# Patient Record
Sex: FEMALE | Race: BLACK OR AFRICAN AMERICAN | NOT HISPANIC OR LATINO | URBAN - METROPOLITAN AREA
[De-identification: names, ages, dates, MRNs, and addresses within clinical notes are randomized per-mention and may not be internally consistent; named-entity substitution may affect disease eponyms.]

---

## 2020-07-25 ENCOUNTER — TELEPHONE ENCOUNTER (OUTPATIENT)
Dept: URBAN - METROPOLITAN AREA CLINIC 13 | Facility: CLINIC | Age: 75
End: 2020-07-25

## 2020-07-25 RX ORDER — POLYETHYLENE GLYCOL 3350, SODIUM CHLORIDE, SODIUM BICARBONATE AND POTASSIUM CHLORIDE WITH LEMON FLAVOR 420; 11.2; 5.72; 1.48 G/4L; G/4L; G/4L; G/4L
TAKE 1/2 GALLON AT 5:00 PM DAY BEFORE PROCEDURE, TAKE SECOND 1/2 OF GALLON 6 HRS PRIOR TO PROCEDURE POWDER, FOR SOLUTION ORAL
Qty: 1 | Refills: 0 | OUTPATIENT
Start: 2018-10-15 | End: 2018-11-26

## 2020-07-26 ENCOUNTER — TELEPHONE ENCOUNTER (OUTPATIENT)
Dept: URBAN - METROPOLITAN AREA CLINIC 13 | Facility: CLINIC | Age: 75
End: 2020-07-26

## 2020-07-26 RX ORDER — METOPROLOL TARTRATE 50 MG/1
TAKE 1 TABLET DAILY TABLET, FILM COATED ORAL
Refills: 0 | Status: ACTIVE | COMMUNITY
Start: 2018-10-15

## 2020-07-26 RX ORDER — LOSARTAN POTASSIUM AND HYDROCHLOROTHIAZIDE 100; 25 MG/1; MG/1
TAKE 1 TABLET ONCE DAILY TABLET, FILM COATED ORAL
Refills: 0 | Status: ACTIVE | COMMUNITY
Start: 2018-10-15

## 2020-07-26 RX ORDER — AMLODIPINE BESYLATE 5 MG/1
TAKE 1 TABLET DAILY FOR BLOOD PRESSURE TABLET ORAL
Refills: 0 | Status: ACTIVE | COMMUNITY
Start: 2018-10-15

## 2020-07-26 RX ORDER — NAPROXEN SODIUM 220 MG
TAKE 1 TABLET DAILY PRN TABLET ORAL
Refills: 0 | Status: ACTIVE | COMMUNITY
Start: 2018-10-15

## 2023-01-01 ENCOUNTER — NON-APPOINTMENT (OUTPATIENT)
Age: 78
End: 2023-01-01

## 2023-01-01 ENCOUNTER — OUTPATIENT (OUTPATIENT)
Dept: OUTPATIENT SERVICES | Facility: HOSPITAL | Age: 78
LOS: 1 days | Discharge: HOME | End: 2023-01-01

## 2023-01-01 ENCOUNTER — APPOINTMENT (OUTPATIENT)
Dept: HEMATOLOGY ONCOLOGY | Facility: CLINIC | Age: 78
End: 2023-01-01

## 2023-01-01 ENCOUNTER — INPATIENT (INPATIENT)
Facility: HOSPITAL | Age: 78
LOS: 0 days | End: 2023-10-03
Attending: STUDENT IN AN ORGANIZED HEALTH CARE EDUCATION/TRAINING PROGRAM | Admitting: STUDENT IN AN ORGANIZED HEALTH CARE EDUCATION/TRAINING PROGRAM
Payer: MEDICARE

## 2023-01-01 ENCOUNTER — TRANSCRIPTION ENCOUNTER (OUTPATIENT)
Age: 78
End: 2023-01-01

## 2023-01-01 VITALS
HEIGHT: 70 IN | DIASTOLIC BLOOD PRESSURE: 57 MMHG | WEIGHT: 264.55 LBS | OXYGEN SATURATION: 98 % | SYSTOLIC BLOOD PRESSURE: 126 MMHG | RESPIRATION RATE: 18 BRPM | HEART RATE: 68 BPM | TEMPERATURE: 98 F

## 2023-01-01 VITALS
SYSTOLIC BLOOD PRESSURE: 111 MMHG | RESPIRATION RATE: 25 BRPM | HEART RATE: 108 BPM | OXYGEN SATURATION: 95 % | DIASTOLIC BLOOD PRESSURE: 78 MMHG

## 2023-01-01 DIAGNOSIS — Z29.9 ENCOUNTER FOR PROPHYLACTIC MEASURES, UNSPECIFIED: ICD-10-CM

## 2023-01-01 DIAGNOSIS — I10 ESSENTIAL (PRIMARY) HYPERTENSION: ICD-10-CM

## 2023-01-01 DIAGNOSIS — Z98.49 CATARACT EXTRACTION STATUS, UNSPECIFIED EYE: Chronic | ICD-10-CM

## 2023-01-01 DIAGNOSIS — A41.9 SEPSIS, UNSPECIFIED ORGANISM: ICD-10-CM

## 2023-01-01 DIAGNOSIS — I26.99 OTHER PULMONARY EMBOLISM WITHOUT ACUTE COR PULMONALE: ICD-10-CM

## 2023-01-01 DIAGNOSIS — N17.9 ACUTE KIDNEY FAILURE, UNSPECIFIED: ICD-10-CM

## 2023-01-01 DIAGNOSIS — C53.0 MALIGNANT NEOPLASM OF ENDOCERVIX: ICD-10-CM

## 2023-01-01 DIAGNOSIS — R09.89 OTHER SPECIFIED SYMPTOMS AND SIGNS INVOLVING THE CIRCULATORY AND RESPIRATORY SYSTEMS: ICD-10-CM

## 2023-01-01 DIAGNOSIS — D50.9 IRON DEFICIENCY ANEMIA, UNSPECIFIED: ICD-10-CM

## 2023-01-01 DIAGNOSIS — R65.10 SYSTEMIC INFLAMMATORY RESPONSE SYNDROME (SIRS) OF NON-INFECTIOUS ORIGIN WITHOUT ACUTE ORGAN DYSFUNCTION: ICD-10-CM

## 2023-01-01 DIAGNOSIS — E78.5 HYPERLIPIDEMIA, UNSPECIFIED: ICD-10-CM

## 2023-01-01 DIAGNOSIS — C55 MALIGNANT NEOPLASM OF UTERUS, PART UNSPECIFIED: ICD-10-CM

## 2023-01-01 DIAGNOSIS — E04.1 NONTOXIC SINGLE THYROID NODULE: ICD-10-CM

## 2023-01-01 LAB
ALBUMIN SERPL ELPH-MCNC: 2.4 G/DL — LOW (ref 3.3–5)
ALBUMIN SERPL ELPH-MCNC: 2.4 G/DL — LOW (ref 3.3–5)
ALBUMIN SERPL ELPH-MCNC: 2.8 G/DL — LOW (ref 3.3–5)
ALP SERPL-CCNC: 78 U/L — SIGNIFICANT CHANGE UP (ref 40–120)
ALP SERPL-CCNC: 81 U/L — SIGNIFICANT CHANGE UP (ref 40–120)
ALP SERPL-CCNC: 92 U/L — SIGNIFICANT CHANGE UP (ref 40–120)
ALT FLD-CCNC: 10 U/L — SIGNIFICANT CHANGE UP (ref 4–33)
ALT FLD-CCNC: 11 U/L — SIGNIFICANT CHANGE UP (ref 4–33)
ALT FLD-CCNC: 12 U/L — SIGNIFICANT CHANGE UP (ref 4–33)
ANION GAP SERPL CALC-SCNC: 14 MMOL/L — SIGNIFICANT CHANGE UP (ref 7–14)
ANION GAP SERPL CALC-SCNC: 17 MMOL/L — HIGH (ref 7–14)
ANION GAP SERPL CALC-SCNC: 19 MMOL/L — HIGH (ref 7–14)
APPEARANCE UR: ABNORMAL
APTT BLD: 27.6 SEC — SIGNIFICANT CHANGE UP (ref 24.5–35.6)
APTT BLD: 79.5 SEC — HIGH (ref 24.5–35.6)
APTT BLD: 94 SEC — HIGH (ref 24.5–35.6)
AST SERPL-CCNC: 32 U/L — SIGNIFICANT CHANGE UP (ref 4–32)
AST SERPL-CCNC: 33 U/L — HIGH (ref 4–32)
AST SERPL-CCNC: 40 U/L — HIGH (ref 4–32)
BACTERIA # UR AUTO: NEGATIVE /HPF — SIGNIFICANT CHANGE UP
BASE EXCESS BLDV CALC-SCNC: -2.1 MMOL/L — LOW (ref -2–3)
BASE EXCESS BLDV CALC-SCNC: -5.3 MMOL/L — LOW (ref -2–3)
BASE EXCESS BLDV CALC-SCNC: -5.8 MMOL/L — LOW (ref -2–3)
BASOPHILS # BLD AUTO: 0.02 K/UL — SIGNIFICANT CHANGE UP (ref 0–0.2)
BASOPHILS NFR BLD AUTO: 0.1 % — SIGNIFICANT CHANGE UP (ref 0–2)
BILIRUB SERPL-MCNC: 0.6 MG/DL — SIGNIFICANT CHANGE UP (ref 0.2–1.2)
BILIRUB SERPL-MCNC: 0.6 MG/DL — SIGNIFICANT CHANGE UP (ref 0.2–1.2)
BILIRUB SERPL-MCNC: 0.8 MG/DL — SIGNIFICANT CHANGE UP (ref 0.2–1.2)
BILIRUB UR-MCNC: NEGATIVE — SIGNIFICANT CHANGE UP
BLD GP AB SCN SERPL QL: NEGATIVE — SIGNIFICANT CHANGE UP
BLOOD GAS VENOUS COMPREHENSIVE RESULT: SIGNIFICANT CHANGE UP
BUN SERPL-MCNC: 24 MG/DL — HIGH (ref 7–23)
BUN SERPL-MCNC: 28 MG/DL — HIGH (ref 7–23)
BUN SERPL-MCNC: 29 MG/DL — HIGH (ref 7–23)
CA-I SERPL-SCNC: 1.31 MMOL/L — SIGNIFICANT CHANGE UP (ref 1.15–1.33)
CALCIUM SERPL-MCNC: 8.9 MG/DL — SIGNIFICANT CHANGE UP (ref 8.4–10.5)
CALCIUM SERPL-MCNC: 9.1 MG/DL — SIGNIFICANT CHANGE UP (ref 8.4–10.5)
CALCIUM SERPL-MCNC: 9.7 MG/DL — SIGNIFICANT CHANGE UP (ref 8.4–10.5)
CAST: 21 /LPF — HIGH (ref 0–4)
CHLORIDE BLDV-SCNC: 100 MMOL/L — SIGNIFICANT CHANGE UP (ref 96–108)
CHLORIDE BLDV-SCNC: 102 MMOL/L — SIGNIFICANT CHANGE UP (ref 96–108)
CHLORIDE BLDV-SCNC: 103 MMOL/L — SIGNIFICANT CHANGE UP (ref 96–108)
CHLORIDE SERPL-SCNC: 100 MMOL/L — SIGNIFICANT CHANGE UP (ref 98–107)
CHLORIDE SERPL-SCNC: 101 MMOL/L — SIGNIFICANT CHANGE UP (ref 98–107)
CHLORIDE SERPL-SCNC: 99 MMOL/L — SIGNIFICANT CHANGE UP (ref 98–107)
CO2 BLDV-SCNC: 20.5 MMOL/L — LOW (ref 22–26)
CO2 BLDV-SCNC: 21.1 MMOL/L — LOW (ref 22–26)
CO2 BLDV-SCNC: 24.5 MMOL/L — SIGNIFICANT CHANGE UP (ref 22–26)
CO2 SERPL-SCNC: 18 MMOL/L — LOW (ref 22–31)
CO2 SERPL-SCNC: 20 MMOL/L — LOW (ref 22–31)
CO2 SERPL-SCNC: 21 MMOL/L — LOW (ref 22–31)
COLOR SPEC: SIGNIFICANT CHANGE UP
CREAT SERPL-MCNC: 1.42 MG/DL — HIGH (ref 0.5–1.3)
CREAT SERPL-MCNC: 1.56 MG/DL — HIGH (ref 0.5–1.3)
CREAT SERPL-MCNC: 1.72 MG/DL — HIGH (ref 0.5–1.3)
DIFF PNL FLD: ABNORMAL
EGFR: 30 ML/MIN/1.73M2 — LOW
EGFR: 34 ML/MIN/1.73M2 — LOW
EGFR: 38 ML/MIN/1.73M2 — LOW
EOSINOPHIL # BLD AUTO: 0 K/UL — SIGNIFICANT CHANGE UP (ref 0–0.5)
EOSINOPHIL # BLD AUTO: 0.01 K/UL — SIGNIFICANT CHANGE UP (ref 0–0.5)
EOSINOPHIL # BLD AUTO: 0.01 K/UL — SIGNIFICANT CHANGE UP (ref 0–0.5)
EOSINOPHIL NFR BLD AUTO: 0 % — SIGNIFICANT CHANGE UP (ref 0–6)
EOSINOPHIL NFR BLD AUTO: 0 % — SIGNIFICANT CHANGE UP (ref 0–6)
EOSINOPHIL NFR BLD AUTO: 0.1 % — SIGNIFICANT CHANGE UP (ref 0–6)
FERRITIN SERPL-MCNC: 8622 NG/ML — HIGH (ref 13–330)
FLUAV AG NPH QL: SIGNIFICANT CHANGE UP
FLUBV AG NPH QL: SIGNIFICANT CHANGE UP
GAS PNL BLDV: 135 MMOL/L — LOW (ref 136–145)
GAS PNL BLDV: 136 MMOL/L — SIGNIFICANT CHANGE UP (ref 136–145)
GAS PNL BLDV: 137 MMOL/L — SIGNIFICANT CHANGE UP (ref 136–145)
GAS PNL BLDV: SIGNIFICANT CHANGE UP
GLUCOSE BLDV-MCNC: 121 MG/DL — HIGH (ref 70–99)
GLUCOSE BLDV-MCNC: 127 MG/DL — HIGH (ref 70–99)
GLUCOSE BLDV-MCNC: 130 MG/DL — HIGH (ref 70–99)
GLUCOSE SERPL-MCNC: 105 MG/DL — HIGH (ref 70–99)
GLUCOSE SERPL-MCNC: 120 MG/DL — HIGH (ref 70–99)
GLUCOSE SERPL-MCNC: 139 MG/DL — HIGH (ref 70–99)
GLUCOSE UR QL: NEGATIVE MG/DL — SIGNIFICANT CHANGE UP
HCO3 BLDV-SCNC: 19 MMOL/L — LOW (ref 22–29)
HCO3 BLDV-SCNC: 20 MMOL/L — LOW (ref 22–29)
HCO3 BLDV-SCNC: 23 MMOL/L — SIGNIFICANT CHANGE UP (ref 22–29)
HCT VFR BLD CALC: 24.2 % — LOW (ref 34.5–45)
HCT VFR BLD CALC: 25.4 % — LOW (ref 34.5–45)
HCT VFR BLD CALC: 26.1 % — LOW (ref 34.5–45)
HCT VFR BLD CALC: 28.7 % — LOW (ref 34.5–45)
HCT VFR BLDA CALC: 21 % — CRITICAL LOW (ref 34.5–46.5)
HCT VFR BLDA CALC: 24 % — LOW (ref 34.5–46.5)
HCT VFR BLDA CALC: 26 % — LOW (ref 34.5–46.5)
HGB BLD CALC-MCNC: 7 G/DL — CRITICAL LOW (ref 11.7–16.1)
HGB BLD CALC-MCNC: 8.1 G/DL — LOW (ref 11.7–16.1)
HGB BLD CALC-MCNC: 8.6 G/DL — LOW (ref 11.7–16.1)
HGB BLD-MCNC: 7.3 G/DL — LOW (ref 11.5–15.5)
HGB BLD-MCNC: 7.6 G/DL — LOW (ref 11.5–15.5)
HGB BLD-MCNC: 7.7 G/DL — LOW (ref 11.5–15.5)
HGB BLD-MCNC: 8.5 G/DL — LOW (ref 11.5–15.5)
IANC: 17.91 K/UL — HIGH (ref 1.8–7.4)
IANC: 18.15 K/UL — HIGH (ref 1.8–7.4)
IANC: 18.22 K/UL — HIGH (ref 1.8–7.4)
IMM GRANULOCYTES NFR BLD AUTO: 0.6 % — SIGNIFICANT CHANGE UP (ref 0–0.9)
IMM GRANULOCYTES NFR BLD AUTO: 0.9 % — SIGNIFICANT CHANGE UP (ref 0–0.9)
IMM GRANULOCYTES NFR BLD AUTO: 1.1 % — HIGH (ref 0–0.9)
INR BLD: 1.3 RATIO — HIGH (ref 0.85–1.18)
IRON SATN MFR SERPL: 10 % — LOW (ref 14–50)
IRON SATN MFR SERPL: 17 UG/DL — LOW (ref 30–160)
KETONES UR-MCNC: NEGATIVE MG/DL — SIGNIFICANT CHANGE UP
LACTATE BLDV-MCNC: 3.9 MMOL/L — HIGH (ref 0.5–2)
LACTATE BLDV-MCNC: 5.4 MMOL/L — CRITICAL HIGH (ref 0.5–2)
LACTATE BLDV-MCNC: 9.1 MMOL/L — CRITICAL HIGH (ref 0.5–2)
LEUKOCYTE ESTERASE UR-ACNC: ABNORMAL
LYMPHOCYTES # BLD AUTO: 0.34 K/UL — LOW (ref 1–3.3)
LYMPHOCYTES # BLD AUTO: 0.78 K/UL — LOW (ref 1–3.3)
LYMPHOCYTES # BLD AUTO: 0.95 K/UL — LOW (ref 1–3.3)
LYMPHOCYTES # BLD AUTO: 1.7 % — LOW (ref 13–44)
LYMPHOCYTES # BLD AUTO: 3.9 % — LOW (ref 13–44)
LYMPHOCYTES # BLD AUTO: 4.7 % — LOW (ref 13–44)
MAGNESIUM SERPL-MCNC: 2.2 MG/DL — SIGNIFICANT CHANGE UP (ref 1.6–2.6)
MCHC RBC-ENTMCNC: 23.2 PG — LOW (ref 27–34)
MCHC RBC-ENTMCNC: 23.4 PG — LOW (ref 27–34)
MCHC RBC-ENTMCNC: 23.6 PG — LOW (ref 27–34)
MCHC RBC-ENTMCNC: 23.7 PG — LOW (ref 27–34)
MCHC RBC-ENTMCNC: 29.5 GM/DL — LOW (ref 32–36)
MCHC RBC-ENTMCNC: 29.6 GM/DL — LOW (ref 32–36)
MCHC RBC-ENTMCNC: 29.9 GM/DL — LOW (ref 32–36)
MCHC RBC-ENTMCNC: 30.2 GM/DL — LOW (ref 32–36)
MCV RBC AUTO: 77.6 FL — LOW (ref 80–100)
MCV RBC AUTO: 78.6 FL — LOW (ref 80–100)
MCV RBC AUTO: 79.1 FL — LOW (ref 80–100)
MCV RBC AUTO: 79.7 FL — LOW (ref 80–100)
MONOCYTES # BLD AUTO: 0.76 K/UL — SIGNIFICANT CHANGE UP (ref 0–0.9)
MONOCYTES # BLD AUTO: 0.97 K/UL — HIGH (ref 0–0.9)
MONOCYTES # BLD AUTO: 1.07 K/UL — HIGH (ref 0–0.9)
MONOCYTES NFR BLD AUTO: 3.9 % — SIGNIFICANT CHANGE UP (ref 2–14)
MONOCYTES NFR BLD AUTO: 4.9 % — SIGNIFICANT CHANGE UP (ref 2–14)
MONOCYTES NFR BLD AUTO: 5.2 % — SIGNIFICANT CHANGE UP (ref 2–14)
NEUTROPHILS # BLD AUTO: 17.91 K/UL — HIGH (ref 1.8–7.4)
NEUTROPHILS # BLD AUTO: 18.15 K/UL — HIGH (ref 1.8–7.4)
NEUTROPHILS # BLD AUTO: 18.22 K/UL — HIGH (ref 1.8–7.4)
NEUTROPHILS NFR BLD AUTO: 89.4 % — HIGH (ref 43–77)
NEUTROPHILS NFR BLD AUTO: 90.1 % — HIGH (ref 43–77)
NEUTROPHILS NFR BLD AUTO: 93.2 % — HIGH (ref 43–77)
NITRITE UR-MCNC: NEGATIVE — SIGNIFICANT CHANGE UP
NRBC # BLD: 0 /100 WBCS — SIGNIFICANT CHANGE UP (ref 0–0)
NRBC # BLD: 1 /100 WBCS — HIGH (ref 0–0)
NRBC # FLD: 0.14 K/UL — HIGH (ref 0–0)
NRBC # FLD: 0.15 K/UL — HIGH (ref 0–0)
NRBC # FLD: 0.17 K/UL — HIGH (ref 0–0)
NRBC # FLD: 0.19 K/UL — HIGH (ref 0–0)
NT-PROBNP SERPL-SCNC: HIGH PG/ML
PCO2 BLDV: 36 MMHG — LOW (ref 39–52)
PCO2 BLDV: 37 MMHG — LOW (ref 39–52)
PCO2 BLDV: 41 MMHG — SIGNIFICANT CHANGE UP (ref 39–52)
PH BLDV: 7.34 — SIGNIFICANT CHANGE UP (ref 7.32–7.43)
PH BLDV: 7.34 — SIGNIFICANT CHANGE UP (ref 7.32–7.43)
PH BLDV: 7.36 — SIGNIFICANT CHANGE UP (ref 7.32–7.43)
PH UR: 6 — SIGNIFICANT CHANGE UP (ref 5–8)
PHOSPHATE SERPL-MCNC: 5 MG/DL — HIGH (ref 2.5–4.5)
PLATELET # BLD AUTO: 257 K/UL — SIGNIFICANT CHANGE UP (ref 150–400)
PLATELET # BLD AUTO: 258 K/UL — SIGNIFICANT CHANGE UP (ref 150–400)
PLATELET # BLD AUTO: 272 K/UL — SIGNIFICANT CHANGE UP (ref 150–400)
PLATELET # BLD AUTO: 278 K/UL — SIGNIFICANT CHANGE UP (ref 150–400)
PO2 BLDV: 36 MMHG — SIGNIFICANT CHANGE UP (ref 25–45)
PO2 BLDV: 49 MMHG — HIGH (ref 25–45)
PO2 BLDV: 70 MMHG — HIGH (ref 25–45)
POTASSIUM BLDV-SCNC: 4.5 MMOL/L — SIGNIFICANT CHANGE UP (ref 3.5–5.1)
POTASSIUM BLDV-SCNC: 4.8 MMOL/L — SIGNIFICANT CHANGE UP (ref 3.5–5.1)
POTASSIUM BLDV-SCNC: 4.8 MMOL/L — SIGNIFICANT CHANGE UP (ref 3.5–5.1)
POTASSIUM SERPL-MCNC: 4.5 MMOL/L — SIGNIFICANT CHANGE UP (ref 3.5–5.3)
POTASSIUM SERPL-MCNC: 4.7 MMOL/L — SIGNIFICANT CHANGE UP (ref 3.5–5.3)
POTASSIUM SERPL-MCNC: 5.2 MMOL/L — SIGNIFICANT CHANGE UP (ref 3.5–5.3)
POTASSIUM SERPL-SCNC: 4.5 MMOL/L — SIGNIFICANT CHANGE UP (ref 3.5–5.3)
POTASSIUM SERPL-SCNC: 4.7 MMOL/L — SIGNIFICANT CHANGE UP (ref 3.5–5.3)
POTASSIUM SERPL-SCNC: 5.2 MMOL/L — SIGNIFICANT CHANGE UP (ref 3.5–5.3)
PROT SERPL-MCNC: 5.6 G/DL — LOW (ref 6–8.3)
PROT SERPL-MCNC: 5.7 G/DL — LOW (ref 6–8.3)
PROT SERPL-MCNC: 6.2 G/DL — SIGNIFICANT CHANGE UP (ref 6–8.3)
PROT UR-MCNC: 30 MG/DL
PROTHROM AB SERPL-ACNC: 14.4 SEC — HIGH (ref 9.5–13)
RBC # BLD: 3.12 M/UL — LOW (ref 3.8–5.2)
RBC # BLD: 3.12 M/UL — LOW (ref 3.8–5.2)
RBC # BLD: 3.21 M/UL — LOW (ref 3.8–5.2)
RBC # BLD: 3.32 M/UL — LOW (ref 3.8–5.2)
RBC # BLD: 3.6 M/UL — LOW (ref 3.8–5.2)
RBC # FLD: 18.4 % — HIGH (ref 10.3–14.5)
RBC # FLD: 18.7 % — HIGH (ref 10.3–14.5)
RBC # FLD: 18.9 % — HIGH (ref 10.3–14.5)
RBC # FLD: 19 % — HIGH (ref 10.3–14.5)
RBC CASTS # UR COMP ASSIST: 168 /HPF — HIGH (ref 0–4)
RETICS #: 146.3 K/UL — HIGH (ref 25–125)
RETICS/RBC NFR: 4.7 % — HIGH (ref 0.5–2.5)
REVIEW: SIGNIFICANT CHANGE UP
RH IG SCN BLD-IMP: POSITIVE — SIGNIFICANT CHANGE UP
RSV RNA NPH QL NAA+NON-PROBE: SIGNIFICANT CHANGE UP
SAO2 % BLDV: 43 % — LOW (ref 67–88)
SAO2 % BLDV: 78.9 % — SIGNIFICANT CHANGE UP (ref 67–88)
SAO2 % BLDV: 89.3 % — HIGH (ref 67–88)
SARS-COV-2 RNA SPEC QL NAA+PROBE: SIGNIFICANT CHANGE UP
SODIUM SERPL-SCNC: 136 MMOL/L — SIGNIFICANT CHANGE UP (ref 135–145)
SODIUM SERPL-SCNC: 136 MMOL/L — SIGNIFICANT CHANGE UP (ref 135–145)
SODIUM SERPL-SCNC: 137 MMOL/L — SIGNIFICANT CHANGE UP (ref 135–145)
SP GR SPEC: 1.02 — SIGNIFICANT CHANGE UP (ref 1–1.03)
SQUAMOUS # UR AUTO: 1 /HPF — SIGNIFICANT CHANGE UP (ref 0–5)
TIBC SERPL-MCNC: 177 UG/DL — LOW (ref 220–430)
TRANSFERRIN SERPL-MCNC: 135 MG/DL — LOW (ref 200–360)
TROPONIN T, HIGH SENSITIVITY RESULT: 53 NG/L — CRITICAL HIGH
UIBC SERPL-MCNC: 160 UG/DL — SIGNIFICANT CHANGE UP (ref 110–370)
URATE CRY FLD QL MICRO: PRESENT
UROBILINOGEN FLD QL: 1 MG/DL — SIGNIFICANT CHANGE UP (ref 0.2–1)
WBC # BLD: 19.48 K/UL — HIGH (ref 3.8–10.5)
WBC # BLD: 19.86 K/UL — HIGH (ref 3.8–10.5)
WBC # BLD: 20.4 K/UL — HIGH (ref 3.8–10.5)
WBC # BLD: 20.46 K/UL — HIGH (ref 3.8–10.5)
WBC # FLD AUTO: 19.48 K/UL — HIGH (ref 3.8–10.5)
WBC # FLD AUTO: 19.86 K/UL — HIGH (ref 3.8–10.5)
WBC # FLD AUTO: 20.4 K/UL — HIGH (ref 3.8–10.5)
WBC # FLD AUTO: 20.46 K/UL — HIGH (ref 3.8–10.5)
WBC UR QL: 21 /HPF — HIGH (ref 0–5)

## 2023-01-01 PROCEDURE — 99223 1ST HOSP IP/OBS HIGH 75: CPT

## 2023-01-01 PROCEDURE — 99223 1ST HOSP IP/OBS HIGH 75: CPT | Mod: GC

## 2023-01-01 PROCEDURE — 99291 CRITICAL CARE FIRST HOUR: CPT

## 2023-01-01 PROCEDURE — 74174 CTA ABD&PLVS W/CONTRAST: CPT | Mod: 26,MA

## 2023-01-01 PROCEDURE — 71045 X-RAY EXAM CHEST 1 VIEW: CPT | Mod: 26

## 2023-01-01 PROCEDURE — 71275 CT ANGIOGRAPHY CHEST: CPT | Mod: 26,MA

## 2023-01-01 RX ORDER — MORPHINE SULFATE 50 MG/1
4 CAPSULE, EXTENDED RELEASE ORAL ONCE
Refills: 0 | Status: DISCONTINUED | OUTPATIENT
Start: 2023-01-01 | End: 2023-01-01

## 2023-01-01 RX ORDER — OXYCODONE HYDROCHLORIDE 5 MG/1
10 TABLET ORAL EVERY 4 HOURS
Refills: 0 | Status: DISCONTINUED | OUTPATIENT
Start: 2023-01-01 | End: 2023-01-01

## 2023-01-01 RX ORDER — POLYETHYLENE GLYCOL 3350 17 G/17G
17 POWDER, FOR SOLUTION ORAL DAILY
Refills: 0 | Status: DISCONTINUED | OUTPATIENT
Start: 2023-01-01 | End: 2023-01-01

## 2023-01-01 RX ORDER — SODIUM CHLORIDE 9 MG/ML
1000 INJECTION INTRAMUSCULAR; INTRAVENOUS; SUBCUTANEOUS ONCE
Refills: 0 | Status: COMPLETED | OUTPATIENT
Start: 2023-01-01 | End: 2023-01-01

## 2023-01-01 RX ORDER — OXYCODONE HYDROCHLORIDE 5 MG/1
5 TABLET ORAL EVERY 4 HOURS
Refills: 0 | Status: DISCONTINUED | OUTPATIENT
Start: 2023-01-01 | End: 2023-01-01

## 2023-01-01 RX ORDER — HEPARIN SODIUM 5000 [USP'U]/ML
INJECTION INTRAVENOUS; SUBCUTANEOUS
Qty: 25000 | Refills: 0 | Status: DISCONTINUED | OUTPATIENT
Start: 2023-01-01 | End: 2023-01-01

## 2023-01-01 RX ORDER — HEPARIN SODIUM 5000 [USP'U]/ML
10000 INJECTION INTRAVENOUS; SUBCUTANEOUS ONCE
Refills: 0 | Status: COMPLETED | OUTPATIENT
Start: 2023-01-01 | End: 2023-01-01

## 2023-01-01 RX ORDER — HEPARIN SODIUM 5000 [USP'U]/ML
5000 INJECTION INTRAVENOUS; SUBCUTANEOUS EVERY 6 HOURS
Refills: 0 | Status: DISCONTINUED | OUTPATIENT
Start: 2023-01-01 | End: 2023-01-01

## 2023-01-01 RX ORDER — LANOLIN ALCOHOL/MO/W.PET/CERES
3 CREAM (GRAM) TOPICAL AT BEDTIME
Refills: 0 | Status: DISCONTINUED | OUTPATIENT
Start: 2023-01-01 | End: 2023-01-01

## 2023-01-01 RX ORDER — HEPARIN SODIUM 5000 [USP'U]/ML
10000 INJECTION INTRAVENOUS; SUBCUTANEOUS EVERY 6 HOURS
Refills: 0 | Status: DISCONTINUED | OUTPATIENT
Start: 2023-01-01 | End: 2023-01-01

## 2023-01-01 RX ORDER — TRAMADOL HYDROCHLORIDE 50 MG/1
50 TABLET ORAL EVERY 6 HOURS
Refills: 0 | Status: DISCONTINUED | OUTPATIENT
Start: 2023-01-01 | End: 2023-01-01

## 2023-01-01 RX ORDER — SENNA PLUS 8.6 MG/1
2 TABLET ORAL AT BEDTIME
Refills: 0 | Status: DISCONTINUED | OUTPATIENT
Start: 2023-01-01 | End: 2023-01-01

## 2023-01-01 RX ORDER — ACETAMINOPHEN 500 MG
650 TABLET ORAL EVERY 6 HOURS
Refills: 0 | Status: DISCONTINUED | OUTPATIENT
Start: 2023-01-01 | End: 2023-01-01

## 2023-01-01 RX ORDER — SODIUM CHLORIDE 9 MG/ML
500 INJECTION INTRAMUSCULAR; INTRAVENOUS; SUBCUTANEOUS ONCE
Refills: 0 | Status: COMPLETED | OUTPATIENT
Start: 2023-01-01 | End: 2023-01-01

## 2023-01-01 RX ORDER — ACETAMINOPHEN 500 MG
650 TABLET ORAL ONCE
Refills: 0 | Status: COMPLETED | OUTPATIENT
Start: 2023-01-01 | End: 2023-01-01

## 2023-01-01 RX ORDER — NALOXONE HYDROCHLORIDE 4 MG/.1ML
0.4 SPRAY NASAL ONCE
Refills: 0 | Status: DISCONTINUED | OUTPATIENT
Start: 2023-01-01 | End: 2023-01-01

## 2023-01-01 RX ADMIN — SODIUM CHLORIDE 1000 MILLILITER(S): 9 INJECTION INTRAMUSCULAR; INTRAVENOUS; SUBCUTANEOUS at 11:52

## 2023-01-01 RX ADMIN — TRAMADOL HYDROCHLORIDE 50 MILLIGRAM(S): 50 TABLET ORAL at 08:36

## 2023-01-01 RX ADMIN — HEPARIN SODIUM 2200 UNIT(S)/HR: 5000 INJECTION INTRAVENOUS; SUBCUTANEOUS at 14:40

## 2023-01-01 RX ADMIN — HEPARIN SODIUM 2200 UNIT(S)/HR: 5000 INJECTION INTRAVENOUS; SUBCUTANEOUS at 20:30

## 2023-01-01 RX ADMIN — HEPARIN SODIUM 2200 UNIT(S)/HR: 5000 INJECTION INTRAVENOUS; SUBCUTANEOUS at 22:07

## 2023-01-01 RX ADMIN — HEPARIN SODIUM 10000 UNIT(S): 5000 INJECTION INTRAVENOUS; SUBCUTANEOUS at 14:39

## 2023-01-01 RX ADMIN — Medication 260 MILLIGRAM(S): at 06:23

## 2023-01-01 RX ADMIN — SODIUM CHLORIDE 1000 MILLILITER(S): 9 INJECTION INTRAMUSCULAR; INTRAVENOUS; SUBCUTANEOUS at 13:14

## 2023-01-01 RX ADMIN — MORPHINE SULFATE 4 MILLIGRAM(S): 50 CAPSULE, EXTENDED RELEASE ORAL at 11:52

## 2023-01-01 RX ADMIN — HEPARIN SODIUM 2200 UNIT(S)/HR: 5000 INJECTION INTRAVENOUS; SUBCUTANEOUS at 04:18

## 2023-01-01 RX ADMIN — HEPARIN SODIUM 2200 UNIT(S)/HR: 5000 INJECTION INTRAVENOUS; SUBCUTANEOUS at 02:54

## 2023-01-01 RX ADMIN — TRAMADOL HYDROCHLORIDE 50 MILLIGRAM(S): 50 TABLET ORAL at 04:39

## 2023-01-01 RX ADMIN — TRAMADOL HYDROCHLORIDE 50 MILLIGRAM(S): 50 TABLET ORAL at 05:20

## 2023-06-01 ENCOUNTER — TELEPHONE ENCOUNTER (OUTPATIENT)
Dept: URBAN - METROPOLITAN AREA CLINIC 113 | Facility: CLINIC | Age: 78
End: 2023-06-01

## 2023-09-14 ENCOUNTER — OFFICE VISIT (OUTPATIENT)
Dept: URBAN - METROPOLITAN AREA CLINIC 113 | Facility: CLINIC | Age: 78
End: 2023-09-14

## 2023-10-02 NOTE — H&P ADULT - NSHPADDITIONALINFOADULT_GEN_ALL_CORE
PDI	First Name	Last Name	Birth Date	Gender	Street Address	[facility]	Zip Code  [place]	AUGUSTO FUENTES	1945	Female	[address]	[address]    Prescription Information      PDI Filter:    PDI	My Rx	Current Rx	Rx Written	Rx Dispensed	Drug	Strength	Quantity	Days Supply	Prescriber Name	Payment Method	Dispenser  A	N	N	09/01/2023	09/01/2023	TRAMADOL HCL 50 MG TABLET		40.0	7	REJI HYDE	Medicare	[place]

## 2023-10-02 NOTE — ED ADULT NURSE NOTE - OBJECTIVE STATEMENT
pt A&ox4, coming to ED from home for abdominal pain for 1 month. pts daughter at bedside states pt moved from [place] back to [place] after being diagnosed with Cervical Ca. pt appears SOB with bilateral lower extremities swelling to ankles, feet, legs, +4 edema. pt endorses worsening SOB on exertion. past medical history: HTN. pt denies Chest pain. pt denies H/A , Dizziness , lightheadedness , and radiating chest pain. sinus tachycardic. on telemetry. breathing is spontaneous and unlabored. sating 99% on RA. abdomen is round. soft, and tender to RUQ, LUQ, RLQ.  pt denies fevers or chills, Nausea, Vomiting, Diarrhea. bilateral pedal and radial pulses palpable and strong. left ac 20g IV placed Labs drawn and sent as per ordered. Bed in lowest position, call bell within reach, all other safety and comfort measures provided. awaiting labs results and further orders.

## 2023-10-02 NOTE — H&P ADULT - NSHPLABSRESULTS_GEN_ALL_CORE
7.7    20.46 )-----------( 257      ( 02 Oct 2023 20:40 )             26.1       10-    136  |  99  |  24<H>  ----------------------------<  139<H>  4.7   |  18<L>  |  1.42<H>    Ca    9.7      02 Oct 2023 11:25  Phos  4.8     10-  Mg     2.30     10    TPro  6.2  /  Alb  2.8<L>  /  TBili  0.8  /  DBili  x   /  AST  32  /  ALT  11  /  AlkPhos  92  10              Urinalysis Basic - ( 02 Oct 2023 20:24 )    Color: Dark Yellow / Appearance: Turbid / S.022 / pH: x  Gluc: x / Ketone: Negative mg/dL  / Bili: Negative / Urobili: 1.0 mg/dL   Blood: x / Protein: 30 mg/dL / Nitrite: Negative   Leuk Esterase: Moderate / RBC: 168 /HPF / WBC 21 /HPF   Sq Epi: x / Non Sq Epi: 1 /HPF / Bacteria: Negative /HPF        PT/INR - ( 02 Oct 2023 11:25 )   PT: 14.4 sec;   INR: 1.30 ratio         PTT - ( 02 Oct 2023 20:40 )  PTT:94.0 sec    Lactate Trend      CARDIAC MARKERS ( 02 Oct 2023 11:25 )  x     / x     / 29 U/L / x     / 1.3 ng/mL        CAPILLARY BLOOD GLUCOSE 7.7    20.46 )-----------( 257      ( 02 Oct 2023 20:40 )             26.1       10-02    136  |  99  |  24<H>  ----------------------------<  139<H>  4.7   |  18<L>  |  1.42<H>    Ca    9.7      02 Oct 2023 11:25  Phos  4.8     10-  Mg     2.30     10    TPro  6.2  /  Alb  2.8<L>  /  TBili  0.8  /  DBili  x   /  AST  32  /  ALT  11  /  AlkPhos  92  10              Urinalysis Basic - ( 02 Oct 2023 20:24 )    Color: Dark Yellow / Appearance: Turbid / S.022 / pH: x  Gluc: x / Ketone: Negative mg/dL  / Bili: Negative / Urobili: 1.0 mg/dL   Blood: x / Protein: 30 mg/dL / Nitrite: Negative   Leuk Esterase: Moderate / RBC: 168 /HPF / WBC 21 /HPF   Sq Epi: x / Non Sq Epi: 1 /HPF / Bacteria: Negative /HPF        PT/INR - ( 02 Oct 2023 11:25 )   PT: 14.4 sec;   INR: 1.30 ratio         PTT - ( 02 Oct 2023 20:40 )  PTT:94.0 sec    Lactate Trend      CARDIAC MARKERS ( 02 Oct 2023 11:25 )  x     / x     / 29 U/L / x     / 1.3 ng/mL        CAPILLARY BLOOD GLUCOSE    < from: CT Angio Chest PE Protocol w/ IV Cont (10.02.23 @ 17:34) >    IMPRESSION:  Pulmonary embolism in the left lower lobe segmental and subsegmental   pulmonary arteries with increased RV/LV ratio suspicious for right heart   strain.    Bilobar pulmonary nodules and masses, most compatible with pulmonary   metastatic disease.    Partially imaged peritoneal carcinomatosis, better appreciated on earlier   same-day CT abdomen and pelvis.    Mild asymmetric enlargement of the left thyroid lobe, possible underlying   nodule. If clinically warranted, more definitive characterization may be   obtained with targeted ultrasound on a nonemergent outpatient basis.    < end of copied text >    < from: CT Angio Abdomen and Pelvis w/ IV Cont (10.02.23 @ 12:42) >    IMPRESSION:  *  Acute pulmonary embolus in the left lower lobe.  *  Peritoneal, pulmonary, and paraspinal metastatic disease.  *  No evidence of mesenteric ischemia, as clinically questioned.  *  Mild bilateral hydronephrosis.    Labs personally reviewed  Imaging reviewed  EKG: sinus tachy, no acute ischemic changes

## 2023-10-02 NOTE — H&P ADULT - PROBLEM SELECTOR PLAN 8
DVT PPx: on hep gtt  Diet: Regular  Bowel Regimen: Miralax/Senna/  Code: Full - see GoC above  Dispo: PT/OT Pending Hospital Course  Pharmacy: --  PCP: --  Communication: Attempted to reach daughter x3 by phone    --------------------------------------------------------------  Rick Stein MD  PGY-2, Internal Medicine  Microsoft Teams preferred  -------------------------------------------------------------- -incidentally noted left thyroid asymmetry on imaging  -non-urgent f/u

## 2023-10-02 NOTE — H&P ADULT - CONVERSATION DETAILS
Discussed with patient at bedside preferences for care. Patient states she defers her medical decisions for escalation of care to her only daughter. When asked about resuscitation with CPR and intubation, patient initially expressed a preference to avoid, but stated she would like to have a conversation with her daughter as they have discussed this in the past. She also mentioned appointing her daughter as her durable POA for healthcare. For now, patient remains full code.

## 2023-10-02 NOTE — CONSULT NOTE ADULT - SUBJECTIVE AND OBJECTIVE BOX
Patient seen and evaluated at bedside    Chief Complaint: abdominal pain, SAGASTUME     HPI:  77 yo woman with hx of cervical ca s/p RT (1999), HTN, HLD, obesity, and recently diagnosed uterine cancer who was BIBEMS for weakness and abdominal pain.     Accompanied by daughter. Per daughter, patient was recently relocated to [place] to start cancer treatment here at [facility]. Was supposed to go to an onc appt today but then patient developed weakness in her legs that caused her to call 911. At this time, patient also reported feeling lightheaded. Has chronic SAGASTUME - only able to walk a few steps; ongoing for months. Denies chest pain, palpitations, nausea, vomiting, fevers, and syncope. No hx of blood clots.     Upon arrival to the ED; afebrile ; BP 94/66  Cr 1.42  BNP 31012  trop 55>53  LA 9>3.9  Recieved IVF     CT AP revealed peritoneal, pulmonary, and paraspinal metastatic disease. Also LLL acute PE. Signs of RV/LV ratio >1.   CTA chest also with RV dysfunction   Pending TTE  CXR chest with mets present    On exam, patient appears uncomfortable. Denies chest pain. +SOB  Not on oxygen   [id number]  -105  Started on heparin gtt already   Complaining of abdominal pain       PMHx:   Cervical cancer screening  Essential hypertension  Morbid obesity, unspecified obesity type  Cervical cancer        PSHx:   No significant past surgical history  S/P cataract extraction        Allergies:  No Known Allergies      Home Meds:    Current Medications:   heparin   Injectable 14077 Unit(s) IV Push every 6 hours PRN  heparin   Injectable 5000 Unit(s) IV Push every 6 hours PRN  heparin  Infusion.  Unit(s)/Hr IV Continuous <Continuous>    REVIEW OF SYSTEMS:  CONSTITUTIONAL: +weakness  EYES/ENT: No visual changes;  No dysphagia  NECK: No pain or stiffness  RESPIRATORY: No cough, wheezing, hemoptysis; +SOB  CARDIOVASCULAR: No chest pain or palpitations; BLE edema   GASTROINTESTINAL:  No nausea, vomiting, or hematemesis; No diarrhea or constipation. No melena or hematochezia.  BACK: +back pain  GENITOURINARY: No dysuria, frequency or hematuria  NEUROLOGICAL: No numbness or weakness  SKIN: No itching, burning, rashes, or lesions   All other review of systems is negative unless indicated above.    Physical Exam:  T(F): 98.3 (10-02), Max: 98.3 (10-02)  HR: 105 (10-02) (68 - 105)  BP: 101/72 (10-02) (94/66 - 126/57)  RR: 22 (10-02)  SpO2: 98% (10-02)  GENERAL: mild distress  CHEST/LUNG: Clear to auscultation anteriorly  BACK: No spinal tenderness  HEART: tachycardic. regular rhythm. no murmurs, rubs  ABDOMEN: Soft, distended, masses   EXTREMITIES:  BLE edema present   NEUROLOGY: AAOx3, non-focal, cranial nerves intact  SKIN: Normal color, No rashes or lesions  LINES:      Labs: Personally reviewed                        8.5    19.48 )-----------( 278      ( 02 Oct 2023 11:25 )             28.7     10-02    136  |  99  |  24<H>  ----------------------------<  139<H>  4.7   |  18<L>  |  1.42<H>    Ca    9.7      02 Oct 2023 11:25  Phos  4.8     10-02  Mg     2.30     10-02    TPro  6.2  /  Alb  2.8<L>  /  TBili  0.8  /  DBili  x   /  AST  32  /  ALT  11  /  AlkPhos  92  10-02    PT/INR - ( 02 Oct 2023 11:25 )   PT: 14.4 sec;   INR: 1.30 ratio         PTT - ( 02 Oct 2023 11:25 )  PTT:27.6 sec    CARDIAC MARKERS ( 02 Oct 2023 17:45 )  53 ng/L / x     / x     / x     / x     / x      CARDIAC MARKERS ( 02 Oct 2023 11:25 )  55 ng/L / x     / x     / 29 U/L / x     / 1.3 ng/mL      CT AP   FINDINGS:  LOWER CHEST: Small right pleural effusion. Bilateral pulmonary metastases   largest measuring 2.2 cm in the right middle lobe. Left lower lobe   pulmonary arterial filling defect, concerning for pulmonary embolus.    LIVER: Subcentimeter lesion left hepatic lobe too small to characterize.  BILE DUCTS: Normal caliber.  GALLBLADDER: Within normal limits.  SPLEEN: Within normal limits.  PANCREAS: Within normal limits.  ADRENALS: Within normal limits.  KIDNEYS/URETERS: Mild bilateral hydroureteronephrosis. Left renal cyst   measures 1.8 cm.    BLADDER: Within normal limits.  REPRODUCTIVE ORGANS: Dominant pelvic mass measures 21.3 x 8.6 x 20.0 cm,   abutting the distal colon, rectum, and several loops of small bowel.    BOWEL: No bowel obstruction. Appendix is normal. Uncomplicated   diverticulosis.  PERITONEUM: Trace free fluid in the right paracolic gutter. Peritoneal   carcinomatosis. For reference:  *  Large implant along the posterior mid abdominal aspect of the right   hepatic lobe measures 11.4 x 5.7 cm. (3:32)  *  Left upper quadrant implant measures 3.4 x 2.5 cm. (3:45)  *  Mesenteric implant abutting a loop of small bowel measures 8.3 x 4.3   cm (3:63)  *  Umbilical implant measures 4.5 x 2.8 cm. (3:88)  VESSELS: Atherosclerotic calcifications. Celiac axis, SMA, and PATRICE are   patent.  RETROPERITONEUM/LYMPH NODES: No lymphadenopathy.  ABDOMINAL WALL: Within normal limits.  BONES: Degenerative changes. Right paraspinal mass measures 3.0 x 2.0 cm   at the level of T11.    IMPRESSION:  *  Acute pulmonary embolus in the left lower lobe.  *  Peritoneal, pulmonary, and paraspinal metastatic disease.  *  No evidence of mesenteric ischemia, as clinically questioned.  *  Mild bilateral hydronephrosis.      CXR  FINDINGS:  Bilateral pulmonary opacities consistent with metastasis.  Bilateral trace pleural effusions.  There is no pneumothorax.  The heart is normal in size  The visualized osseous structures demonstrate no acute pathology.  No free intraperitoneal air.  IMPRESSION: Pulmonary metastases.    CTA pending

## 2023-10-02 NOTE — CONSULT NOTE ADULT - ASSESSMENT
77 yo woman with hx of cervical ca s/p RT (1999), HTN, HLD, obesity, and recently diagnosed uterine cancer BIBEMS for abdominal pain with CT AP revealing extensive mets to lung, liver, and peritoneum. Also new LLL acute PE on heparin gtt. Cardiology consulted for PERT team evaluation.     Intermediate-high risk PE given elevated cardiac markers and signs of RV dysfunction on CT AP/CTA chest RV/LV ratio >1. Hemodynamically stable. Patient is not in shock or requiring supplemental oxygen at this time. It is difficult to ascertain if her RV dysfunction is from PE or mainly from extensive pulmonary metastases seen on personal review of images.   Given that her vitals are stable and she is not on oxygen, she does not need to be monitored in the CCU.   -continue with heparin gtt  -follow up CTA chest read  -check TTE   -monitor vitals  -monitor on telemetry     Please call [phone] with any questions.     Case discussed with on call PERT attending Dr. Lelo Argueta.     Familia Orona MD   Cardiology Fellow PGY4     
Patient is a 79 yo F w/ recently diagnosed uterine/cervical ca (5/2023), HTN, HLD who presents with abdominal pain and SOB, found to have acute PE.    Acute PE   - please obtain dedicated CTA of chest  - would consult cardiology/interventional cardiology given intermediate high risk PE  - continue with heparin drip   - please obtain LE dopplers   - follow up echo  - trend pro-BNP  - trend trops to peak   - monitor on tele and continuos pulse ox     Lactic acidosis   - would recommend infectious workup and empiric abx   - continue to trend lactate     Not a MICU candidate at this time, please re-consult if needed   Case discussed with attending

## 2023-10-02 NOTE — ED ADULT NURSE NOTE - NSFALLUNIVINTERV_ED_ALL_ED
Bed/Stretcher in lowest position, wheels locked, appropriate side rails in place/Call bell, personal items and telephone in reach/Instruct patient to call for assistance before getting out of bed/chair/stretcher/Non-slip footwear applied when patient is off stretcher/[place] to call system/Physically safe environment - no spills, clutter or unnecessary equipment/Purposeful proactive rounding/Room/bathroom lighting operational, light cord in reach

## 2023-10-02 NOTE — H&P ADULT - ATTENDING COMMENTS
79 y/o F with a PMHx of cervical CA s/p RT, HTN, HLD, obesity, and recently diagnosed metastatic uterine CA presenting for acute on chronic abdominal pain as well as generalized weakness and diarrhea p/w SIRs found to have intermediate-risk submassive PE. Appreciate micu and cards eval, not a PERT candidate at this time. C/w hep gtt, pending TTE. Pt also w/ leukocytosis however no localizing signs of infection and no fevers. Hold off antibiotics however low threshold to start if any fevers, worsening leukocytosis or lactic acidosis. Also noted with downtrending hgb,( no evidence of bleeding) and uptrending Cr in the setting of mild b/l hydro. Unclear etiology for ARSENIO, pending urine lytes. Will need uro/IR eval for nephrostomy tubes. Trend bmp q8hr. Full code for now pending further discussion with daughter. 79 y/o F with a PMHx of cervical CA s/p RT, HTN, HLD, obesity, and recently diagnosed metastatic uterine CA presenting for acute on chronic abdominal pain as well as generalized weakness and diarrhea p/w SIRs found to have intermediate-risk submassive PE. Appreciate micu and cards eval, not a PERT candidate at this time. C/w hep gtt, pending TTE. Pt also w/ leukocytosis however no localizing signs of infection and no fevers. Hold off antibiotics however low threshold to start if any fevers, worsening leukocytosis or lactic acidosis. Check stool studies if still with diarrhea. Also noted with downtrending hgb,( no evidence of bleeding) and uptrending Cr in the setting of mild b/l hydro. Unclear etiology for ARSENIO, pending urine lytes. Will need uro/IR eval for nephrostomy tubes. Trend bmp q8hr. Full code for now pending further discussion with daughter.

## 2023-10-02 NOTE — H&P ADULT - NSHPSOCIALHISTORY_GEN_ALL_CORE
No toxic habits No toxic habits reported, retired from sales, lives with daughter and has large family support here in [facility]

## 2023-10-02 NOTE — ED PROVIDER NOTE - PHYSICAL EXAMINATION
Gen: AAOx3, non-toxic  Head: NCAT  HEENT: EOMI, oral mucosa moist, normal conjunctiva  Lung: CTAB, no respiratory distress, no wheezes/rhonchi/rales B/L,   CV: RRR, no murmurs, rubs or gallops  Abd: soft, diffuse ttp, mild localization to LLQ, no guarding, no CVA tenderness  MSK: no visible deformities  Neuro: No focal sensory or motor deficits  Skin: Warm, well perfused, no rash  Psych: normal affect.

## 2023-10-02 NOTE — H&P ADULT - PROBLEM SELECTOR PLAN 1
Patient presents with acute on chr abdominal pain, noted to have PE of LLL on cross-sect imaging with evidence of RV/LV ratio increase on same, likely 2/2 metastatic disease; classified as intermed-high risk given trop elev and imaging  -MICU and PERT cards recs appreciated; unclear if cardiac imaging findings attributable to metastatic disease vs PE  -c/w therapeutic heparin gtt  -monitor for hemodynamic changes, if worsens would obtain STAT TTE but o/w will defer to AM  -c/w telemetry

## 2023-10-02 NOTE — H&P ADULT - PROBLEM SELECTOR PLAN 3
-Cr 1.4x, only prior on record is 0.6x from 2015; will attempt to obtain collateral from daughter  -Likely multifactorial, as has mild hydronephrosis on imaging likely 2/2 metastatic disease, pre-renal vs. infectious etiology  -trend Cr on BMP -Cr 1.4x, only prior on record is 0.6x from 2015; will attempt to obtain collateral from daughter  -Likely multifactorial, as has mild hydronephrosis on imaging likely 2/2 metastatic disease, pre-renal vs. infectious etiology  -avoid nephrotoxins and renally dose all medications  -c/w external urinary catheter, monitor I/O  -trend Cr on BMP
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
## 2023-10-02 NOTE — ED PROVIDER NOTE - CLINICAL SUMMARY MEDICAL DECISION MAKING FREE TEXT BOX
79 yo F w recently diagnosed uterine cancer, HLD, HTN, presents to the ED c/o abdominal pain since may, acutely worsening over last few days. Pt accompanied by daughter who provides hx. States pt recently diagnosed with uterine cancer in May in [place], moved to [facility] w daughter to establish care. had apt with suman today, but this morning pt had intractable abdominal pain and unable to ambulate, usually controlled with torodal, now uncontrolled. Passing gas appropriately, stooling diarrhea 2-3x a day. Denies fevers, chills, nausea, vomiting, dizziness, chest pain, SOB, dysuria, hematuria. VSS. Clinically stable. Physical exam remarkable for diffuse abdominal ttp, mildly localized to LLQ, nondistended. Clinical suspicion for abd pain 2/2 ca mets, vs divertic.    - Ct abd pelvis r.o abdominal pathology  - labs, fluids, pain control

## 2023-10-02 NOTE — ED ADULT NURSE NOTE - CHIEF COMPLAINT QUOTE
pt with hx cervical ca, on chemo, c/o of lower abd pain for few days, denies vag bleed, denies dysuria

## 2023-10-02 NOTE — CONSULT NOTE ADULT - ATTENDING COMMENTS
pt seen and discussed with fellow on 10/2/23  plan was discussed as above  unclear if RV strain secondary to PE vs. metastatic disease, though pt without clear oxygen requirement  significantly elevated lactate concerning for infection
Patient is a 77 yo F w/ recently diagnosed uterine/cervical ca (5/2023), HTN, HLD who presents with abdominal pain and SOB, found to have acute PE. MICU consulted for PE.    Labs notable for leukocytosis to WBC 19.4, Hb 8.5, Creatinine 1.42, initial lactate 8.1 improved to 3.9, Procal 1.9, Trop 55, BNP 70252.     CT AP with acute LLL PE, Peritoneal, pulmonary, and paraspinal metastatic, Mild bilateral hydronephrosis.    #Acute PE - Intermediate High risk. RV appears large on CT. Of note [place]NP to [address], Trop 55. Patient does have history of LE edema but no known history of HF  #Lactic acidosis  #Acute hypoxemic respiratory failure  - Currently HD stable on minimal O2 requirements (2L nc)  - Please obtain dedicated CTA chest  - Please obtain/expedite TTE  - Please call cardiology/interventional cardiology given intermediate high risk PE (relayed to ED team)  - Agree with heparin gtt for now  - Recommend infectious workup and empiric abx as well  - Trend lactate, troponins, BNP  - Needs telemetry monitoring and continuous pulse ox    Patient does not require MICU level of care at this time.  Please re-consult as needed.      Michael Delgado MD  Pulmonary & Critical Care

## 2023-10-02 NOTE — ED ADULT NURSE NOTE - NSSEPSISSUSPECTED_ED_A_ED
Attempted to call both patient and Gloria, patient's wife. No voicemail available on wife's phone. Message left for Gil, requesting a call back.   No

## 2023-10-02 NOTE — H&P ADULT - PROBLEM SELECTOR PLAN 4
-Diagnosed 5 mo ago by bx with mets to lungs, liver peritoneum, paraspinal area  -Scheduled for oncology appt today  -pain control with oxy 5mg q4h for moderate, oxy 10 mg q4h for severe pain, Dilaudid IV for breakthrough + BM regimen  -consult Heme-Onc in AM -Diagnosed 5 mo ago by bx with mets to lungs, liver peritoneum, paraspinal area  -Scheduled for oncology appt today  -pain control with tramadol (as per pt preference), Dilaudid IV for breakthrough + BM regimen  -consult Heme-Onc in AM

## 2023-10-02 NOTE — ED PROVIDER NOTE - ATTENDING CONTRIBUTION TO CARE
I (Meredith) agree with above, I performed a history and physical. Counseled danelle medical staff, physician assistant, and/or medical student on medical decision making as documented. Medical decisions and treatment interventions were made in real time during the patient encounter. Additionally and/or with the following exceptions: 78-year-old female with metastatic endometrial cancer, hyperlipidemia, hypertension presents to the emergency department with lower abdominal pain.  Patiently recently moved to the area.  Patient had severe abdominal pain.  Patient is very uncomfortable appearing had diffuse abdominal tenderness to palpation.  Was ill-appearing.  Initial lactate was 9.1 so CT angiography was performed.  PE was identified, BNP was elevated, troponin also elevated, MICU and PERT team were consulted.  Patient was signed out to oncoming attending pending echocardiogram as well as final MICU recommendations.

## 2023-10-02 NOTE — ED ADULT NURSE REASSESSMENT NOTE - NSFALLHARMRISKINTERV_ED_ALL_ED

## 2023-10-02 NOTE — H&P ADULT - HISTORY OF PRESENT ILLNESS
77 y/o F with a PMx ### 77 yo F w recently diagnosed uterine cancer, HLD, HTN, cerv CA (s/p RT 1999) presents to the ED c/o acute on chr waxing and waning lower abd pain x 5 mo but acutely worsening the past several days. States pt recently diagnosed with Bx for uterine cancer in May in [place], moved to [facility] w daughter to establish care this month and was scheduled for first apt with suman today. However, she reports this AM intractable abdominal pain and unable to ambulate 2/2 fatigue and pain, which is usually controlled with tramadol. Patient otherwise reports chronic urinary changes including frequency and incontinence as well as intermittent diarrhea, as well as a mild productive cough with whitish phlegm, now improved.    On arrival, patient's temp 98.3, HR 68, /57, RR 18, and saturating at 96% on room air. Labs were remarkable for WBC count 19.48, Hgb 8.5, INR 1.3, creatinine 1.42, Trop of 55, pro-BNP 30,785, and a lactate of 9.1 off VBG with repeat of 3.1. CTA of abdomen and pelvis showed Acute pulmonary embolus in the left lower lobe, peritoneal, pulmonary, and paraspinal metastatic disease, no evidence of mesenteric ischemia, as clinically questioned ,mild bilateral hydronephrosis.

## 2023-10-02 NOTE — H&P ADULT - ASSESSMENT
77 y/o F with a PMHx of cervical CA s/p RT, HTN, HLD, obesity, and recently diagnosed metastatic uterine CA presenting for acute on chronic abdominal pain as well as generalized weakness and diarrhea, found to have intermediate-risk PE, now on heparin, pending TTE. 77 y/o F with a PMHx of cervical CA s/p RT, HTN, HLD, obesity, and recently diagnosed metastatic uterine CA presenting for acute on chronic abdominal pain as well as generalized weakness and diarrhea, meeting SIRS for severe sepsis with lactic acidosis, found to have intermediate-risk PE, now on heparin, pending TTE.

## 2023-10-02 NOTE — H&P ADULT - PROBLEM SELECTOR PLAN 7
-incidentally noted left thyroid asymmetry on imaging  -non-urgent f/u -will clarify home meds with daughter

## 2023-10-02 NOTE — H&P ADULT - NSHPPHYSICALEXAM_GEN_ALL_CORE
PHYSICAL EXAM:  GENERAL: Clinically well-appearing and comfortable  HEAD:  Atraumatic, Normocephalic  EYES: EOMI, PERRL, conjunctiva and sclera clear  NECK: Supple, No JVD  CHEST/LUNG: Clear to auscultation bilaterally; No wheeze  HEART: Regular rate and rhythm; No murmurs, rubs, or gallops  ABDOMEN: Soft, Nontender, Nondistended; Bowel sounds present  EXTREMITIES:  2+ Peripheral Pulses, No clubbing, cyanosis, or edema  PSYCH: Normal mood, Normal affect  NEUROLOGY: non-focal  SKIN: No rashes or lesions PHYSICAL EXAM:  GENERAL: Obese black female, supine, comfortable  HEAD:  Atraumatic, Normocephalic, NC in place  EYES: EOMI, PERRL, conjunctiva and sclera clear  NECK: Supple  CHEST/LUNG: Clear to auscultation bilaterally except slight crackles left lower; No wheeze  HEART: Mildly tachy rate, regular rhythm; No murmurs, rubs, or gallops  ABDOMEN: Soft, mildly TTP over LLQ otherwise non-tender, mildly distended; Bowel sounds present  EXTREMITIES:  2+ Peripheral Pulses, No clubbing, cyanosis. Mild pitting ABDIEL, R>L  PSYCH: Normal mood, Normal affect  NEUROLOGY: non-focal, moving all extremities equally  SKIN: No rashes or lesions

## 2023-10-02 NOTE — H&P ADULT - NSHPREVIEWOFSYSTEMS_GEN_ALL_CORE
REVIEW OF SYSTEMS:    CONSTITUTIONAL: No weakness, fevers or chills  EYES: no blurry vision or eye pain.   ENT: No throat pain. No dysphagia.    NECK: No pain or stiffness  RESPIRATORY: No cough, wheezing, hemoptysis; No shortness of breath  CARDIOVASCULAR: No chest pain or palpitations.  GASTROINTESTINAL: No abdominal pain. No nausea or vomiting; No diarrhea or constipation. No melena or hematochezia.  GENITOURINARY: No dysuria, frequency or hematuria  NEUROLOGICAL: No numbness or weakness. No dizziness or falls.   SKIN: No itching, burning, rashes, or lesions.   LYMPHATIC: No masses or swelling.   All other review of systems is negative unless indicated above. REVIEW OF SYSTEMS:    CONSTITUTIONAL: +weakness, no fevers or chills  EYES: no blurry vision or eye pain.   ENT: No throat pain. No dysphagia.    NECK: No pain or stiffness  RESPIRATORY: +cough, now improved; no wheezing, hemoptysis; +shortness of breath, now improved  CARDIOVASCULAR: No chest pain or palpitations.  GASTROINTESTINAL: +abdominal pain. No nausea or vomiting; +intermittent diarrhea, no constipation. No melena or hematochezia.  GENITOURINARY: No dysuria, +frequency, urgency  NEUROLOGICAL: No numbness, +generalized weakness.   SKIN: No itching, burning, rashes, or lesions.   LYMPHATIC: No masses, mild right leg swelling  All other review of systems is negative unless indicated above.

## 2023-10-02 NOTE — ED PROVIDER NOTE - PROGRESS NOTE DETAILS
Patient and daughter at bedside updated regarding results of CT scan including incidental pulmonary embolism, updated regarding need for heparin and admission,  agreeable to plan at this time, abd pain now resolved, PERT team notified. - Era Salvador, PGY-3 MICU team recommending stat ECHO and CTA to eval for PE which are ordered, also recommend interventional cardiology evaluation, house cardiology consulted due to concern for R heart strain seen by MICU on bedside echo. Patient otherwise comfortable at this time, pending CTA chest and admission at this time. - Era Salvador, PGY-3

## 2023-10-02 NOTE — CONSULT NOTE ADULT - SUBJECTIVE AND OBJECTIVE BOX
CHIEF COMPLAINT: abdominal pain     HPI:79 yo F w recently diagnosed uterine cancer, HLD, HTN, presents to the ED c/o abdominal pain since May, Patient reports that her abdominal pain was acutely worsening over last few days. Pt accompanied by daughter who provided most of this history. States pt recently diagnosed with uterine cancer in May in [place], [facility]. Had apt with [facility] today, but this morning pt had intractable abdominal pain and unable to ambulate, which is usually controlled with torodal; however, now uncontrolled. Patient reports having 3 episodes of diarrhea daily for the past few days.     On arrival, patient's temp 98.3, HR 68, /57, RR 18, and saturating at 96% on room air. Labs were remarkable for     PAST MEDICAL & SURGICAL HISTORY:  Essential hypertension      Morbid obesity, unspecified obesity type      Cervical cancer  1996 radiation      S/P cataract extraction          FAMILY HISTORY:      SOCIAL HISTORY:  Smoking: non smoker   EtOH Use: no alcohol use     Allergies    No Known Allergies    Intolerances        HOME MEDICATIONS:    REVIEW OF SYSTEMS:    CONSTITUTIONAL: No weakness, fevers or chills  EYES/ENT: No visual changes;  No vertigo or throat pain   NECK: No pain or stiffness  RESPIRATORY: No cough, wheezing, hemoptysis; No shortness of breath  CARDIOVASCULAR: No chest pain or palpitations  GASTROINTESTINAL: No abdominal or epigastric pain. No nausea, vomiting, or hematemesis; No diarrhea or constipation. No melena or hematochezia.  GENITOURINARY: No dysuria, frequency or hematuria  NEUROLOGICAL: No numbness or weakness  All other review of systems is negative unless indicated above.    OBJECTIVE:  ICU Vital Signs Last 24 Hrs  T(C): 36.7 (02 Oct 2023 14:49), Max: 36.8 (02 Oct 2023 13:05)  T(F): 98 (02 Oct 2023 14:49), Max: 98.3 (02 Oct 2023 13:05)  HR: 103 (02 Oct 2023 14:49) (68 - 103)  BP: 110/93 (02 Oct 2023 14:49) (94/66 - 126/57)  BP(mean): --  ABP: --  ABP(mean): --  RR: 22 (02 Oct 2023 14:49) (18 - 24)  SpO2: 99% (02 Oct 2023 14:49) (98% - 99%)    O2 Parameters below as of 02 Oct 2023 14:49  Patient On (Oxygen Delivery Method): room air              CAPILLARY BLOOD GLUCOSE          PHYSICAL EXAM:  General:   HEENT:   Lymph Nodes:  Neck:   Respiratory:   Cardiovascular:   Abdomen:   Extremities:   Skin:   Neurological:  Psychiatry:    HOSPITAL MEDICATIONS:  MEDICATIONS  (STANDING):  heparin  Infusion.  Unit(s)/Hr (22 mL/Hr) IV Continuous <Continuous>    MEDICATIONS  (PRN):  heparin   Injectable 17226 Unit(s) IV Push every 6 hours PRN For aPTT less than 40  heparin   Injectable 5000 Unit(s) IV Push every 6 hours PRN For aPTT between 40 - 57      LABS:                        8.5    19.48 )-----------( 278      ( 02 Oct 2023 11:25 )             28.7     10-02    136  |  99  |  24<H>  ----------------------------<  139<H>  4.7   |  18<L>  |  1.42<H>    Ca    9.7      02 Oct 2023 11:25  Phos  4.8     10-02  Mg     2.30     10-02    TPro  6.2  /  Alb  2.8<L>  /  TBili  0.8  /  DBili  x   /  AST  32  /  ALT  11  /  AlkPhos  92  10-02    PT/INR - ( 02 Oct 2023 11:25 )   PT: 14.4 sec;   INR: 1.30 ratio         PTT - ( 02 Oct 2023 11:25 )  PTT:27.6 sec  Urinalysis Basic - ( 02 Oct 2023 11:25 )    Color: x / Appearance: x / SG: x / pH: x  Gluc: 139 mg/dL / Ketone: x  / Bili: x / Urobili: x   Blood: x / Protein: x / Nitrite: x   Leuk Esterase: x / RBC: x / WBC x   Sq Epi: x / Non Sq Epi: x / Bacteria: x        Venous Blood Gas:  10-02 @ 14:40  7.36/41/49/23/78.9  VBG Lactate: 3.9  Venous Blood Gas:  10-02 @ 11:25  7.34/36/36/19/43.0  VBG Lactate: 9.1      MICROBIOLOGY:     RADIOLOGY:  [ ] Reviewed and interpreted by me    EKG: CHIEF COMPLAINT: abdominal pain     HPI:77 yo F w recently diagnosed uterine cancer, HLD, HTN, presents to the ED c/o abdominal pain since May, Patient reports that her abdominal pain was acutely worsening over last few days. Pt accompanied by daughter who provided most of this history. States pt recently diagnosed with uterine cancer in May in [place], moved to [facility] w daughter to establish care. Had apt with [facility] today, but this morning pt had intractable abdominal pain and unable to ambulate, which is usually controlled with torodal; however, now uncontrolled. Patient reports having 3 episodes of diarrhea daily for the past few days.     On arrival, patient's temp 98.3, HR 68, /57, RR 18, and saturating at 96% on room air. Labs were remarkable for WBC count 19.48, Hgb 8.5, INR 1.3, creatinine 1.42, Trop of 55, pro-BNP 30,785, and a lactate of 9.1 off VBG with repeat of 3.1. CTA of abdomen and pelvis showed Acute pulmonary embolus in the left lower lobe, peritoneal, pulmonary, and paraspinal metastatic disease, no evidence of mesenteric ischemia, as clinically questioned ,mild bilateral hydronephrosis.    PAST MEDICAL & SURGICAL HISTORY:  Essential hypertension      Morbid obesity, unspecified obesity type      Cervical cancer  1996 radiation      S/P cataract extraction          FAMILY HISTORY:      SOCIAL HISTORY:  Smoking: non smoker   EtOH Use: no alcohol use     Allergies    No Known Allergies    Intolerances        HOME MEDICATIONS:    REVIEW OF SYSTEMS:    CONSTITUTIONAL: + weakness, no fevers, no chills + dizziness, + lightheadedness   EYES/ENT: No visual changes;  No vertigo or throat pain   NECK: No pain or stiffness  RESPIRATORY: No cough, wheezing, hemoptysis; + shortness of breath  CARDIOVASCULAR: No chest pain or palpitations  GASTROINTESTINAL: + abdominal pain, No nausea, vomiting, or hematemesis; +diarrhea , no constipation. No melena or hematochezia.  GENITOURINARY: No dysuria, frequency or hematuria  NEUROLOGICAL: No numbness or weakness  All other review of systems is negative unless indicated above.    OBJECTIVE:  ICU Vital Signs Last 24 Hrs  T(C): 36.7 (02 Oct 2023 14:49), Max: 36.8 (02 Oct 2023 13:05)  T(F): 98 (02 Oct 2023 14:49), Max: 98.3 (02 Oct 2023 13:05)  HR: 103 (02 Oct 2023 14:49) (68 - 103)  BP: 110/93 (02 Oct 2023 14:49) (94/66 - 126/57)  BP(mean): --  ABP: --  ABP(mean): --  RR: 22 (02 Oct 2023 14:49) (18 - 24)  SpO2: 99% (02 Oct 2023 14:49) (98% - 99%)    O2 Parameters below as of 02 Oct 2023 14:49  Patient On (Oxygen Delivery Method): room air              CAPILLARY BLOOD GLUCOSE          PHYSICAL EXAM:  General: NAD, well groomed, well developed   Head: atraumatic normocephalic  Eyes: pupils equal, round, reactive to light, anicteric sclera   Neck: supple, no JVD, no tender lymphadenopathy   Respiratory: Clear lung fields bilaterally, no wheezes, rales, or rhonchi, no accessory muscle use for respirations   Cardiovascular: tachy to the low 100s, normal s1,s2, no murmurs, rubs, or gallops  Abdomen: Soft, mildly distended, moderately tender across lower abdomen, no rebound tenderness no involuntary guarding  Extremities: Warm, well perfused, 1-2+ lower extremity edema, right calf slightly larger than left, non-tender to palpation  Neurological: AOx3, no focal neurological deficits   Psychiatry: appropriate affect     HOSPITAL MEDICATIONS:  MEDICATIONS  (STANDING):  heparin  Infusion.  Unit(s)/Hr (22 mL/Hr) IV Continuous <Continuous>    MEDICATIONS  (PRN):  heparin   Injectable 95365 Unit(s) IV Push every 6 hours PRN For aPTT less than 40  heparin   Injectable 5000 Unit(s) IV Push every 6 hours PRN For aPTT between 40 - 57      LABS:                        8.5    19.48 )-----------( 278      ( 02 Oct 2023 11:25 )             28.7     10-02    136  |  99  |  24<H>  ----------------------------<  139<H>  4.7   |  18<L>  |  1.42<H>    Ca    9.7      02 Oct 2023 11:25  Phos  4.8     10-02  Mg     2.30     10-02    TPro  6.2  /  Alb  2.8<L>  /  TBili  0.8  /  DBili  x   /  AST  32  /  ALT  11  /  AlkPhos  92  10-02    PT/INR - ( 02 Oct 2023 11:25 )   PT: 14.4 sec;   INR: 1.30 ratio         PTT - ( 02 Oct 2023 11:25 )  PTT:27.6 sec  Urinalysis Basic - ( 02 Oct 2023 11:25 )    Color: x / Appearance: x / SG: x / pH: x  Gluc: 139 mg/dL / Ketone: x  / Bili: x / Urobili: x   Blood: x / Protein: x / Nitrite: x   Leuk Esterase: x / RBC: x / WBC x   Sq Epi: x / Non Sq Epi: x / Bacteria: x        Venous Blood Gas:  10-02 @ 14:40  7.36/41/49/23/78.9  VBG Lactate: 3.9  Venous Blood Gas:  10-02 @ 11:25  7.34/36/36/19/43.0  VBG Lactate: 9.1      MICROBIOLOGY:     RADIOLOGY:  [x] Reviewed and interpreted by me    EKG: sinus tach

## 2023-10-02 NOTE — H&P ADULT - PROBLEM SELECTOR PLAN 9
DVT PPx: on hep gtt  Diet: Regular  Bowel Regimen: Miralax/Senna/  Code: Full - see GoC above  Dispo: PT/OT Pending Hospital Course  Pharmacy: --  PCP: --  Communication: Attempted to reach daughter x3 by phone    --------------------------------------------------------------  Rick Stein MD  PGY-2, Internal Medicine  [employer] Teams preferred  -------------------------------------------------------------- DVT PPx: on hep gtt  Diet: Regular  Bowel Regimen: Miralax/Senna/  Code: Full - see GoC above  Dispo: PT/OT Pending Hospital Course  Pharmacy: -- Please obtain full med list in am  PCP: --  Communication: Attempted to reach daughter x3 by phone    --------------------------------------------------------------  Rick Stein MD  PGY-2, Internal Medicine  [employer] Teams preferred  --------------------------------------------------------------

## 2023-10-02 NOTE — H&P ADULT - PROBLEM SELECTOR PLAN 5
-will clarify home meds with daughter, though patient normotensive here -Drop from 8.5 to 7.7 while here, platelets stable  -no obvious source of bleeding though has hematuria noted and ongoing metastatic disease  -check Fe studies, retic count in AM

## 2023-10-02 NOTE — ED PROVIDER NOTE - OBJECTIVE STATEMENT
79 yo F w recently diagnosed uterine cancer presents to the ED c/o abdominal pain since may, acutely worsening over last few days 77 yo F w recently diagnosed uterine cancer, HLD, HTN, presents to the ED c/o abdominal pain since may, acutely worsening over last few days. Pt accompanied by daughter who provides hx. States pt recently diagnosed with uterine cancer in May in [place], moved to [facility] w daughter to establish care. had apt with [facility]an today, but this morning pt had intractable abdominal pain and unable to ambulate, usually controlled with torodal, now uncontrolled. Passing gas appropriately, stooling diarrhea 2-3x a day. Not on abx, not on chemo. Denies fevers, chills, nausea, vomiting, dizziness, chest pain, SOB, dysuria, hematuria.

## 2023-10-02 NOTE — ED ADULT NURSE REASSESSMENT NOTE - NS ED NURSE REASSESS COMMENT FT1
Admitted Pt received from previous shift RN on stretcher, A/Ox4 answer all questions appropriately  Heparin infusing at the bedside @2200Uints/Hr, verified by myself and previous shift RN Lam Ley  Repeat PTT labs obtained during reassessment, pending results  Pt denies chest pain and SOB during reassessment, breathing is even and unlabored on room air  Abdomin is soft and non distended, non tender to touch  Pt ambulates at home assisted by a walker, moves all four extremities on command, skin is intact  Pt not in any acute distress at time of reassessment, resting comfortably at the bedside  Vital signs stable  Pending ESSU1 RN handoff
RN handoff provided in person to [facility] RN  Pt to be transported via stretcher to [facility] room #9
Ariela RN: 2nd 20G IV established in RAC, repeat VBG drawn and sent. pt started on heparin gtt per full anticoagulation heparin nomogram. pt tolerating well at this time, no acute adverse reactions noted at this time. ICU team at bedside for consult.
pt A&ox4, denies chest pain and SOB. no complaints of pain. denies headache, dizziness, lightheadedness, radiating chest pain. breathing is spontaneous and unlabored. sinus tachycardic on continuos cardiac and pulse ox monitoring in place. bed in lowest position, call bell within reach, siderails up x2. heparin running at 22ml/hr. pt awaiting CT scan.

## 2023-10-02 NOTE — H&P ADULT - PROBLEM SELECTOR PLAN 2
-Meets SIRS with WBC, HR, RR, LA; unclear if secondary to ongoing metastatic disease or acute infection  -UA noted for hematuria, leukorrhea, +LE, but negative for nitrites, bacteria  -Pt afebrile here, reports chronic incontinence and diarrhea, monitor off ABx for now  -received 1.5L in ED with tolerable BP, would defer further volume resuscitation pending TTE -Meets SIRS with WBC, HR, RR, LA; unclear if secondary to ongoing metastatic disease or acute infection  -Elevated NLR indicative of high stress state  -UA noted for hematuria, leukorrhea, +LE, but negative for nitrites, bacteria; reported diarrhea appears chronic though could consider stool studies if worsens here  -Pt afebrile here, reports chronic incontinence and diarrhea, monitor off ABx for now including WBC and fever curve  -received 1.5L in ED with tolerable BP, would defer further volume resuscitation pending TTE

## 2023-10-02 NOTE — H&P ADULT - NSICDXPASTMEDICALHX_GEN_ALL_CORE_FT
PAST MEDICAL HISTORY:  Cervical cancer 1996 radiation    Essential hypertension     Morbid obesity, unspecified obesity type

## 2023-10-03 NOTE — DISCHARGE NOTE FOR THE EXPIRED PATIENT - HOSPITAL COURSE
79 yo F w recently diagnosed uterine cancer, HLD, HTN, cerv CA (s/p RT 1999) presents to the ED c/o acute on chr waxing and waning lower abd pain x 5 mo but acutely worsening the past several days     Pulmonary thromboembolism.   Patient presents with acute on chr abdominal pain, noted to have PE of LLL on cross-sect imaging with evidence of RV/LV ratio increase on same, likely 2/2 metastatic disease; classified as intermed-high risk given trop elev and imaging  -MICU and PERT cards recs appreciated; unclear if cardiac imaging findings attributable to metastatic disease vs PE  -c/w therapeutic heparin gtt  -monitor for hemodynamic changes, if worsens would obtain STAT TTE but o/w will defer to AM  -c/w telemetry.    SIRS with acute organ dysfunction due to infectious process.   -Meets SIRS with WBC, HR, RR, LA; unclear if secondary to ongoing metastatic disease or acute infection  -Elevated NLR indicative of high stress state  -UA noted for hematuria, leukorrhea, +LE, but negative for nitrites, bacteria; reported diarrhea appears chronic though could consider stool studies if worsens here  -Pt afebrile here, reports chronic incontinence and diarrhea, monitor off ABx for now including WBC and fever curve  -received 1.5L in ED with tolerable BP, would defer further volume resuscitation pending TTE.    ARSENIO (acute kidney injury).   -Cr 1.4x, only prior on record is 0.6x from 2015; will attempt to obtain collateral from daughter  -Likely multifactorial, as has mild hydronephrosis on imaging likely 2/2 metastatic disease, pre-renal vs. infectious etiology  -avoid nephrotoxins and renally dose all medications  -c/w external urinary catheter, monitor I/O  -trend Cr on BMP.    Uterine cancer.   -Diagnosed 5 mo ago by bx with mets to lungs, liver peritoneum, paraspinal area  -Scheduled for oncology appt today  -pain control with oxy 5mg q4h for moderate, oxy 10 mg q4h for severe pain, Dilaudid IV for breakthrough + BM regimen  -consulted Heme-Onc     Microcytic anemia.   -Drop from 8.5 to 7.7 while here, platelets stable  -no obvious source of bleeding though has hematuria noted and ongoing metastatic disease    Essential hypertension.   -will clarify home meds with daughter, though patient normotensive here  -defer any home anti-HTN given SIRS state and normotension here.

## 2023-10-03 NOTE — RAPID RESPONSE TEAM SUMMARY - NSSITUATIONBACKGROUNDRRT_GEN_ALL_CORE
79 y/o F with a PMHx of cervical CA s/p RT, HTN, HLD, obesity, and recently diagnosed metastatic uterine CA presenting for acute on chronic abdominal pain as well as generalized weakness and diarrhea p/w SIRS found to have intermediate-risk submassive PE. S/p micu and cards eval, not a PERT candidate, started on heparin gtt. RRT called for unresponsiveness, upgraded to code blue. Was last seen at baseline 5 minutes prior.     CODE BLUE    Code blue called at 10:16. Initial rhythm asystole. ACLS protocol initiated. Possible torsades on monitor prior to event.    On review of most recent labs, worsening ARSENIO but otherwise stable from prior. APPT therapeutic since last evening.    Pt intubated by ER.    In total pt was administered 4x 1mg epinephrine, 1 amp D50, 2g Mag, 1 amp Bicarb. No shocks delivered as patient was asytole/PEA throughout.     Further intervention was determined to be futile. Time of death was 10:37.

## 2023-10-03 NOTE — CHART NOTE - NSCHARTNOTEFT_GEN_A_CORE
See H&P for full presenting information. Pt not seen/examined by writer.    Pt admitted with acute PE LLL on heparin gtt and 2LNC. Was evaluated by MICU/PERT and deemed to have intermediate-high risk PE d/t RV enlargement on CT, elevated BNP, and elevated trop. Cards evaluated pt and agreed with intermediate-high risk PE but difficult to ascertain etiology of RV dysfunction given presence of both PE and pulmonary mets. Given hemodynamic stability, was deemed stable for medical floor with tele monitoring.     Code blue called this morning - pt returned from ECHO unresponsive.   H&P GOC reviewed - full code but pending further discussion with daughter as pt felt she may not want resuscitation, understood that she was full code until decision was made.    Daughter Melania Spivey notified via telephone @ [phone] of pt change in condition. Confirmed that she was to remain full code with full escalation of care. Understood that pt was being resuscitated actively. Was at work and planning to leave immediately to come to the hospital from [place].     Notified by ACP - time of death 10:37am.     Daughter notified at 10:45am. Appreciated updates. Plans to come to the hospital shortly. Did not have any questions. No

## 2023-10-04 LAB
-  BACTEROIDES FRAGILIS: SIGNIFICANT CHANGE UP
CULTURE RESULTS: SIGNIFICANT CHANGE UP
GRAM STN FLD: SIGNIFICANT CHANGE UP
GRAM STN FLD: SIGNIFICANT CHANGE UP
METHOD TYPE: SIGNIFICANT CHANGE UP
ORGANISM # SPEC MICROSCOPIC CNT: SIGNIFICANT CHANGE UP
ORGANISM # SPEC MICROSCOPIC CNT: SIGNIFICANT CHANGE UP
SPECIMEN SOURCE: SIGNIFICANT CHANGE UP
SPECIMEN SOURCE: SIGNIFICANT CHANGE UP

## 2023-10-04 NOTE — ED POST DISCHARGE NOTE - REASON FOR FOLLOW-UP
[facility] called for an abnormal results however upon chart review, pt  on 10/3/2023 at 10:37AM. Lab representative was made aware. Other

## 2023-10-05 LAB
CULTURE RESULTS: SIGNIFICANT CHANGE UP
SPECIMEN SOURCE: SIGNIFICANT CHANGE UP